# Patient Record
Sex: FEMALE | URBAN - METROPOLITAN AREA
[De-identification: names, ages, dates, MRNs, and addresses within clinical notes are randomized per-mention and may not be internally consistent; named-entity substitution may affect disease eponyms.]

---

## 2024-04-05 ENCOUNTER — OV NP (OUTPATIENT)
Dept: URBAN - METROPOLITAN AREA CLINIC 105 | Facility: CLINIC | Age: 54
End: 2024-04-05
Payer: COMMERCIAL

## 2024-04-05 VITALS
SYSTOLIC BLOOD PRESSURE: 155 MMHG | WEIGHT: 158.8 LBS | HEART RATE: 86 BPM | HEIGHT: 65 IN | DIASTOLIC BLOOD PRESSURE: 109 MMHG | BODY MASS INDEX: 26.46 KG/M2 | TEMPERATURE: 97.2 F

## 2024-04-05 DIAGNOSIS — E66.8 OTHER OBESITY: ICD-10-CM

## 2024-04-05 DIAGNOSIS — Z12.11 SCREENING FOR COLON CANCER: ICD-10-CM

## 2024-04-05 PROBLEM — 414916001: Status: ACTIVE | Noted: 2024-04-05

## 2024-04-05 PROCEDURE — 99243 OFF/OP CNSLTJ NEW/EST LOW 30: CPT | Performed by: INTERNAL MEDICINE

## 2024-04-05 RX ORDER — ACYCLOVIR 400 MG/1
TABLET ORAL
Qty: 25 TABLET | Status: ACTIVE | COMMUNITY

## 2024-04-05 RX ORDER — DEXTROAMPHETAMINE SACCHARATE, AMPHETAMINE ASPARTATE, DEXTROAMPHETAMINE SULFATE, AND AMPHETAMINE SULFATE 5; 5; 5; 5 MG/1; MG/1; MG/1; MG/1
1 TABLET TABLET ORAL TWICE A DAY
Status: ACTIVE | COMMUNITY

## 2024-04-05 RX ORDER — CARVEDILOL 6.25 MG/1
1 TABLET WITH FOOD TABLET, FILM COATED ORAL TWICE A DAY
Status: ACTIVE | COMMUNITY

## 2024-04-05 RX ORDER — ALPRAZOLAM 1 MG/1
TABLET ORAL
Qty: 30 TABLET | Status: ACTIVE | COMMUNITY

## 2024-04-05 RX ORDER — SEMAGLUTIDE 0.25 MG/.5ML
INJECTION, SOLUTION SUBCUTANEOUS
Qty: 2 MILLILITER | Status: ACTIVE | COMMUNITY

## 2024-04-05 NOTE — HPI-TODAY'S VISIT:
New pt 54 yo female with PMH of ADHD, HTN, anxiety, and obesity referred by Dr. Scott, presents for a colon cancer screening. A copy of this will be sent to the referring provider. There is no family history of colon polyps or colon cancer. Patient denies change in bowel habits. Patient denies rectal bleeding or melena. Patient denies any cardiac, lung, or kidney problems. Patient denies any digestive symptoms currently. Last colonoscopy: never. Pt notes she started Wegovy 1 month ago but has not had any GI SE.  Note BP is elevated in office today and pt states it is much higher than her usual. Pt is a current smoker >30 yrs.

## 2024-06-28 ENCOUNTER — OFFICE VISIT (OUTPATIENT)
Dept: URBAN - METROPOLITAN AREA MEDICAL CENTER 33 | Facility: MEDICAL CENTER | Age: 54
End: 2024-06-28